# Patient Record
Sex: FEMALE | HISPANIC OR LATINO | Employment: FULL TIME | URBAN - METROPOLITAN AREA
[De-identification: names, ages, dates, MRNs, and addresses within clinical notes are randomized per-mention and may not be internally consistent; named-entity substitution may affect disease eponyms.]

---

## 2018-05-16 ENCOUNTER — PROCEDURE VISIT (OUTPATIENT)
Dept: OBSTETRICS AND GYNECOLOGY | Facility: CLINIC | Age: 38
End: 2018-05-16

## 2018-05-16 ENCOUNTER — INITIAL PRENATAL (OUTPATIENT)
Dept: OBSTETRICS AND GYNECOLOGY | Facility: CLINIC | Age: 38
End: 2018-05-16

## 2018-05-16 VITALS — WEIGHT: 123.4 LBS | DIASTOLIC BLOOD PRESSURE: 70 MMHG | SYSTOLIC BLOOD PRESSURE: 120 MMHG

## 2018-05-16 DIAGNOSIS — O02.1 MISSED AB: Primary | ICD-10-CM

## 2018-05-16 PROCEDURE — 99203 OFFICE O/P NEW LOW 30 MIN: CPT | Performed by: OBSTETRICS & GYNECOLOGY

## 2018-05-16 PROCEDURE — 76817 TRANSVAGINAL US OBSTETRIC: CPT | Performed by: OBSTETRICS & GYNECOLOGY

## 2018-05-16 NOTE — PROGRESS NOTES
Cc: Initial OB visit    History of present illness: 37-year-old  6 para 1 presents for an initial obstetric visit today.  Ultrasound was performed as part of this visit which revealed a 7 week intrauterine pregnancy with no cardiac activity.  The patient denies any abdominal pain.  She denies any vaginal bleeding.  She denies any abdominal trauma.  She does report that her first pregnancy was an ectopic, which required a salpingectomy in [place].  Her second third and fourth pregnancies resulted in miscarriages, which were allowed to proceed naturally area after this, she had a pregnancy resulting in the delivery of a healthy baby at term.  This is the next pregnancy.    Review of systems    This is negative for vaginal bleeding.  It is negative for abdominal pain.  It is negative for fever or chills.  All other systems are reviewed and are negative    Physical examination    The lungs are clear to auscultation bilaterally  Heart has a regular rate and rhythm  The abdomen is soft and nondistended.  There are no palpable masses.  The abdomen is nontender to palpation  Pelvic examination reveals normal female external genitalia.  There is no blood in the vaginal vault.  Cervical os is closed and nontender.  The uterus is 6 weeks' size  There are no adnexal masses present  Extremities are equal in size with minimal edema    Assessment and plan    Missed AB.  I counseled the patient through a  regarding the meaning of these findings and we also reviewed her ultrasound in detail.  We discussed her previous pregnancy history as well as options for management.  The benefits and risks of expectant management with serial quantitative hCGs versus suction D&C were discussed with the patient.  I showed her diagram and answered her questions through .  She would like to manage this expectantly.  We will check a type and screen today as well as a quantitative hCG.  The patient will return to the office  weekly for repeat quantitative hCGs.

## 2018-05-17 LAB
ABO GROUP BLD: NORMAL
BLD GP AB SCN SERPL QL: NEGATIVE
HCG INTACT+B SERPL-ACNC: NORMAL MIU/ML
RH BLD: POSITIVE

## 2018-05-23 ENCOUNTER — RESULTS ENCOUNTER (OUTPATIENT)
Dept: OBSTETRICS AND GYNECOLOGY | Facility: CLINIC | Age: 38
End: 2018-05-23

## 2018-05-23 DIAGNOSIS — O02.1 MISSED AB: ICD-10-CM

## 2018-05-24 LAB — HCG INTACT+B SERPL-ACNC: NORMAL MIU/ML

## 2018-05-29 ENCOUNTER — TELEPHONE (OUTPATIENT)
Dept: OBSTETRICS AND GYNECOLOGY | Facility: CLINIC | Age: 38
End: 2018-05-29

## 2018-05-29 DIAGNOSIS — O03.9 MISCARRIAGE: Primary | ICD-10-CM

## 2018-05-30 ENCOUNTER — RESULTS ENCOUNTER (OUTPATIENT)
Dept: OBSTETRICS AND GYNECOLOGY | Facility: CLINIC | Age: 38
End: 2018-05-30

## 2018-05-30 DIAGNOSIS — O03.9 MISCARRIAGE: ICD-10-CM

## 2018-06-15 ENCOUNTER — OFFICE VISIT (OUTPATIENT)
Dept: OBSTETRICS AND GYNECOLOGY | Facility: CLINIC | Age: 38
End: 2018-06-15

## 2018-06-15 VITALS — SYSTOLIC BLOOD PRESSURE: 120 MMHG | WEIGHT: 128.9 LBS | DIASTOLIC BLOOD PRESSURE: 70 MMHG

## 2018-06-15 DIAGNOSIS — Z30.09 ENCOUNTER FOR COUNSELING REGARDING CONTRACEPTION: ICD-10-CM

## 2018-06-15 DIAGNOSIS — O03.9 MISCARRIAGE: Primary | ICD-10-CM

## 2018-06-15 LAB — HCG INTACT+B SERPL-ACNC: 5.61 MIU/ML

## 2018-06-15 PROCEDURE — 99213 OFFICE O/P EST LOW 20 MIN: CPT | Performed by: OBSTETRICS & GYNECOLOGY

## 2018-06-15 NOTE — PROGRESS NOTES
RUDDY Smith  is a 37 y.o. female who presents for follow-up of a miscarriage.  We have been following quantitative hCGs.  During the first part of June, the patient experienced a miscarriage.  She had heavy bleeding with the passage of clots and tissue as well as a possible formed fetus between June 1 and third.  Since that time, her bleeding has completely stopped and her pain has resolved.  She presents today to confirm complete miscarriage.  All medication took place with the assistance of a telephone .    Chief Complaint   Patient presents with   • Miscarriage       History reviewed. No pertinent past medical history.    History reviewed. No pertinent surgical history.    Social History     Social History   • Marital status: Single     Spouse name: N/A   • Number of children: N/A   • Years of education: N/A     Occupational History   • Not on file.     Social History Main Topics   • Smoking status: Never Smoker   • Smokeless tobacco: Never Used   • Alcohol use No   • Drug use: No   • Sexual activity: Yes     Partners: Male     Other Topics Concern   • Not on file     Social History Narrative   • No narrative on file       The following portions of the patient's history were reviewed and updated as appropriate: allergies, current medications, past family history, past medical history, past social history, past surgical history and problem list.    Review of Systems is negative for abdominal pain or vaginal bleeding.  It is negative for fever or chills.  It is negative for nausea or vomiting.          Physical Exam   Constitutional: She is oriented to person, place, and time. She appears well-developed and well-nourished.   Abdominal: Soft. She exhibits no distension and no mass. There is no tenderness.   Genitourinary:   Genitourinary Comments: Normal female external genitalia  The vagina is without lesion.  There is no blood in the vaginal vault  Cervix is closed and nontender  The uterus is  normal in size.  It is mobile within the pelvis  No adnexal masses or tenderness are present   Neurological: She is alert and oriented to person, place, and time.   Skin: Skin is warm and dry.   Nursing note and vitals reviewed.      Assessment    Aleja was seen today for miscarriage.    Diagnoses and all orders for this visit:    Miscarriage    Encounter for counseling regarding contraception        Plan  1. Completed AB by history and physical findings.  Counseled.  We will check a quantitative hCG and follow this until it reaches 0.  The patient has undergone blood draw today already.  2. Counseled regarding contraception versus conception after the completion of this miscarriage.  The patient declines any hormonal contraception.  She plans to use condoms.  We discussed the benefits risks and alternatives to this and I answered her questions through a telephone .    History   Smoking Status   • Never Smoker   3.     4.

## 2019-08-06 PROCEDURE — 99284 EMERGENCY DEPT VISIT MOD MDM: CPT

## 2019-08-07 ENCOUNTER — APPOINTMENT (OUTPATIENT)
Dept: ULTRASOUND IMAGING | Facility: HOSPITAL | Age: 39
End: 2019-08-07

## 2019-08-07 ENCOUNTER — ANESTHESIA (OUTPATIENT)
Dept: PERIOP | Facility: HOSPITAL | Age: 39
End: 2019-08-07

## 2019-08-07 ENCOUNTER — HOSPITAL ENCOUNTER (INPATIENT)
Facility: HOSPITAL | Age: 39
LOS: 1 days | Discharge: HOME | End: 2019-08-07
Attending: EMERGENCY MEDICINE | Admitting: OBSTETRICS & GYNECOLOGY

## 2019-08-07 ENCOUNTER — ANESTHESIA EVENT (OUTPATIENT)
Dept: PERIOP | Facility: HOSPITAL | Age: 39
End: 2019-08-07

## 2019-08-07 VITALS
DIASTOLIC BLOOD PRESSURE: 62 MMHG | BODY MASS INDEX: 25.32 KG/M2 | RESPIRATION RATE: 20 BRPM | TEMPERATURE: 97.5 F | SYSTOLIC BLOOD PRESSURE: 106 MMHG | HEART RATE: 85 BPM | OXYGEN SATURATION: 98 % | HEIGHT: 60 IN | WEIGHT: 129 LBS

## 2019-08-07 DIAGNOSIS — O00.90 ECTOPIC PREGNANCY: ICD-10-CM

## 2019-08-07 DIAGNOSIS — O00.101 RIGHT TUBAL PREGNANCY WITHOUT INTRAUTERINE PREGNANCY: Primary | ICD-10-CM

## 2019-08-07 LAB
ABO GROUP BLD: NORMAL
ALBUMIN SERPL-MCNC: 4.3 G/DL (ref 3.5–5.2)
ALBUMIN/GLOB SERPL: 1.3 G/DL
ALP SERPL-CCNC: 52 U/L (ref 39–117)
ALT SERPL W P-5'-P-CCNC: 18 U/L (ref 1–33)
ANION GAP SERPL CALCULATED.3IONS-SCNC: 10.5 MMOL/L (ref 5–15)
AST SERPL-CCNC: 22 U/L (ref 1–32)
BACTERIA UR QL AUTO: NORMAL /HPF
BASOPHILS # BLD AUTO: 0.07 10*3/MM3 (ref 0–0.2)
BASOPHILS NFR BLD AUTO: 0.8 % (ref 0–1.5)
BILIRUB SERPL-MCNC: 0.8 MG/DL (ref 0.2–1.2)
BILIRUB UR QL STRIP: NEGATIVE
BUN BLD-MCNC: 11 MG/DL (ref 6–20)
BUN/CREAT SERPL: 13.1 (ref 7–25)
CALCIUM SPEC-SCNC: 9.9 MG/DL (ref 8.6–10.5)
CHLORIDE SERPL-SCNC: 103 MMOL/L (ref 98–107)
CLARITY UR: ABNORMAL
CO2 SERPL-SCNC: 24.5 MMOL/L (ref 22–29)
COLOR UR: YELLOW
CREAT BLD-MCNC: 0.84 MG/DL (ref 0.57–1)
DEPRECATED RDW RBC AUTO: 42.6 FL (ref 37–54)
EOSINOPHIL # BLD AUTO: 0.17 10*3/MM3 (ref 0–0.4)
EOSINOPHIL NFR BLD AUTO: 1.9 % (ref 0.3–6.2)
ERYTHROCYTE [DISTWIDTH] IN BLOOD BY AUTOMATED COUNT: 11.9 % (ref 12.3–15.4)
GFR SERPL CREATININE-BSD FRML MDRD: 76 ML/MIN/1.73
GFR SERPL CREATININE-BSD FRML MDRD: 92 ML/MIN/1.73
GLOBULIN UR ELPH-MCNC: 3.2 GM/DL
GLUCOSE BLD-MCNC: 107 MG/DL (ref 65–99)
GLUCOSE UR STRIP-MCNC: NEGATIVE MG/DL
HCG INTACT+B SERPL-ACNC: 2035 MIU/ML
HCT VFR BLD AUTO: 38.5 % (ref 34–46.6)
HGB BLD-MCNC: 12.5 G/DL (ref 12–15.9)
HGB UR QL STRIP.AUTO: ABNORMAL
HYALINE CASTS UR QL AUTO: NORMAL /LPF
IMM GRANULOCYTES # BLD AUTO: 0.04 10*3/MM3 (ref 0–0.05)
IMM GRANULOCYTES NFR BLD AUTO: 0.5 % (ref 0–0.5)
KETONES UR QL STRIP: NEGATIVE
LEUKOCYTE ESTERASE UR QL STRIP.AUTO: NEGATIVE
LYMPHOCYTES # BLD AUTO: 1.59 10*3/MM3 (ref 0.7–3.1)
LYMPHOCYTES NFR BLD AUTO: 18.1 % (ref 19.6–45.3)
MCH RBC QN AUTO: 32.1 PG (ref 26.6–33)
MCHC RBC AUTO-ENTMCNC: 32.5 G/DL (ref 31.5–35.7)
MCV RBC AUTO: 98.7 FL (ref 79–97)
MONOCYTES # BLD AUTO: 0.63 10*3/MM3 (ref 0.1–0.9)
MONOCYTES NFR BLD AUTO: 7.2 % (ref 5–12)
NEUTROPHILS # BLD AUTO: 6.28 10*3/MM3 (ref 1.7–7)
NEUTROPHILS NFR BLD AUTO: 71.5 % (ref 42.7–76)
NITRITE UR QL STRIP: NEGATIVE
NRBC BLD AUTO-RTO: 0.1 /100 WBC (ref 0–0.2)
PH UR STRIP.AUTO: 7.5 [PH] (ref 5–8)
PLATELET # BLD AUTO: 349 10*3/MM3 (ref 140–450)
PMV BLD AUTO: 9.2 FL (ref 6–12)
POTASSIUM BLD-SCNC: 3.8 MMOL/L (ref 3.5–5.2)
PROT SERPL-MCNC: 7.5 G/DL (ref 6–8.5)
PROT UR QL STRIP: NEGATIVE
RBC # BLD AUTO: 3.9 10*6/MM3 (ref 3.77–5.28)
RBC # UR: NORMAL /HPF
REF LAB TEST METHOD: NORMAL
RH BLD: POSITIVE
SODIUM BLD-SCNC: 138 MMOL/L (ref 136–145)
SP GR UR STRIP: 1.01 (ref 1–1.03)
SQUAMOUS #/AREA URNS HPF: NORMAL /HPF
UROBILINOGEN UR QL STRIP: ABNORMAL
WBC NRBC COR # BLD: 8.78 10*3/MM3 (ref 3.4–10.8)
WBC UR QL AUTO: NORMAL /HPF

## 2019-08-07 PROCEDURE — 0UT50ZZ RESECTION OF RIGHT FALLOPIAN TUBE, OPEN APPROACH: ICD-10-PCS | Performed by: OBSTETRICS & GYNECOLOGY

## 2019-08-07 PROCEDURE — 59151 TREAT ECTOPIC PREGNANCY: CPT | Performed by: OBSTETRICS & GYNECOLOGY

## 2019-08-07 PROCEDURE — 25010000002 HYDROMORPHONE PER 4 MG: Performed by: ANESTHESIOLOGY

## 2019-08-07 PROCEDURE — 86901 BLOOD TYPING SEROLOGIC RH(D): CPT | Performed by: EMERGENCY MEDICINE

## 2019-08-07 PROCEDURE — 88305 TISSUE EXAM BY PATHOLOGIST: CPT | Performed by: OBSTETRICS & GYNECOLOGY

## 2019-08-07 PROCEDURE — 10T20ZZ RESECTION OF PRODUCTS OF CONCEPTION, ECTOPIC, OPEN APPROACH: ICD-10-PCS | Performed by: OBSTETRICS & GYNECOLOGY

## 2019-08-07 PROCEDURE — 36415 COLL VENOUS BLD VENIPUNCTURE: CPT

## 2019-08-07 PROCEDURE — 85025 COMPLETE CBC W/AUTO DIFF WBC: CPT | Performed by: EMERGENCY MEDICINE

## 2019-08-07 PROCEDURE — S0260 H&P FOR SURGERY: HCPCS | Performed by: OBSTETRICS & GYNECOLOGY

## 2019-08-07 PROCEDURE — 25010000002 PROPOFOL 10 MG/ML EMULSION: Performed by: ANESTHESIOLOGY

## 2019-08-07 PROCEDURE — 93976 VASCULAR STUDY: CPT

## 2019-08-07 PROCEDURE — 25010000002 NEOSTIGMINE PER 0.5 MG: Performed by: ANESTHESIOLOGY

## 2019-08-07 PROCEDURE — 84702 CHORIONIC GONADOTROPIN TEST: CPT | Performed by: EMERGENCY MEDICINE

## 2019-08-07 PROCEDURE — 76817 TRANSVAGINAL US OBSTETRIC: CPT

## 2019-08-07 PROCEDURE — 86900 BLOOD TYPING SEROLOGIC ABO: CPT | Performed by: EMERGENCY MEDICINE

## 2019-08-07 PROCEDURE — 25010000002 MIDAZOLAM PER 1 MG: Performed by: ANESTHESIOLOGY

## 2019-08-07 PROCEDURE — 25010000002 DEXAMETHASONE PER 1 MG: Performed by: ANESTHESIOLOGY

## 2019-08-07 PROCEDURE — 25010000002 FENTANYL CITRATE (PF) 100 MCG/2ML SOLUTION: Performed by: ANESTHESIOLOGY

## 2019-08-07 PROCEDURE — 25010000002 ONDANSETRON PER 1 MG: Performed by: OBSTETRICS & GYNECOLOGY

## 2019-08-07 PROCEDURE — 80053 COMPREHEN METABOLIC PANEL: CPT | Performed by: EMERGENCY MEDICINE

## 2019-08-07 PROCEDURE — 81001 URINALYSIS AUTO W/SCOPE: CPT | Performed by: EMERGENCY MEDICINE

## 2019-08-07 RX ORDER — ACETAMINOPHEN 325 MG/1
650 TABLET ORAL ONCE AS NEEDED
Status: DISCONTINUED | OUTPATIENT
Start: 2019-08-07 | End: 2019-08-07 | Stop reason: HOSPADM

## 2019-08-07 RX ORDER — LIDOCAINE HYDROCHLORIDE AND EPINEPHRINE 10; 10 MG/ML; UG/ML
INJECTION, SOLUTION INFILTRATION; PERINEURAL AS NEEDED
Status: DISCONTINUED | OUTPATIENT
Start: 2019-08-07 | End: 2019-08-07 | Stop reason: HOSPADM

## 2019-08-07 RX ORDER — ONDANSETRON 2 MG/ML
4 INJECTION INTRAMUSCULAR; INTRAVENOUS ONCE AS NEEDED
Status: DISCONTINUED | OUTPATIENT
Start: 2019-08-07 | End: 2019-08-07 | Stop reason: HOSPADM

## 2019-08-07 RX ORDER — LIDOCAINE HYDROCHLORIDE 20 MG/ML
INJECTION, SOLUTION INFILTRATION; PERINEURAL AS NEEDED
Status: DISCONTINUED | OUTPATIENT
Start: 2019-08-07 | End: 2019-08-07 | Stop reason: SURG

## 2019-08-07 RX ORDER — FAMOTIDINE 10 MG/ML
20 INJECTION, SOLUTION INTRAVENOUS ONCE
Status: COMPLETED | OUTPATIENT
Start: 2019-08-07 | End: 2019-08-07

## 2019-08-07 RX ORDER — FENTANYL CITRATE 50 UG/ML
50 INJECTION, SOLUTION INTRAMUSCULAR; INTRAVENOUS
Status: DISCONTINUED | OUTPATIENT
Start: 2019-08-07 | End: 2019-08-07 | Stop reason: HOSPADM

## 2019-08-07 RX ORDER — ROCURONIUM BROMIDE 10 MG/ML
INJECTION, SOLUTION INTRAVENOUS AS NEEDED
Status: DISCONTINUED | OUTPATIENT
Start: 2019-08-07 | End: 2019-08-07 | Stop reason: SURG

## 2019-08-07 RX ORDER — HYDRALAZINE HYDROCHLORIDE 20 MG/ML
5 INJECTION INTRAMUSCULAR; INTRAVENOUS
Status: DISCONTINUED | OUTPATIENT
Start: 2019-08-07 | End: 2019-08-07 | Stop reason: HOSPADM

## 2019-08-07 RX ORDER — HYDROMORPHONE HCL 110MG/55ML
PATIENT CONTROLLED ANALGESIA SYRINGE INTRAVENOUS AS NEEDED
Status: DISCONTINUED | OUTPATIENT
Start: 2019-08-07 | End: 2019-08-07 | Stop reason: SURG

## 2019-08-07 RX ORDER — PROMETHAZINE HYDROCHLORIDE 25 MG/ML
12.5 INJECTION, SOLUTION INTRAMUSCULAR; INTRAVENOUS ONCE AS NEEDED
Status: DISCONTINUED | OUTPATIENT
Start: 2019-08-07 | End: 2019-08-07 | Stop reason: HOSPADM

## 2019-08-07 RX ORDER — SODIUM CHLORIDE, SODIUM LACTATE, POTASSIUM CHLORIDE, CALCIUM CHLORIDE 600; 310; 30; 20 MG/100ML; MG/100ML; MG/100ML; MG/100ML
9 INJECTION, SOLUTION INTRAVENOUS CONTINUOUS
Status: DISCONTINUED | OUTPATIENT
Start: 2019-08-07 | End: 2019-08-07 | Stop reason: HOSPADM

## 2019-08-07 RX ORDER — ONDANSETRON 2 MG/ML
4 INJECTION INTRAMUSCULAR; INTRAVENOUS EVERY 6 HOURS PRN
Status: DISCONTINUED | OUTPATIENT
Start: 2019-08-07 | End: 2019-08-07 | Stop reason: HOSPADM

## 2019-08-07 RX ORDER — FLUMAZENIL 0.1 MG/ML
0.2 INJECTION INTRAVENOUS AS NEEDED
Status: DISCONTINUED | OUTPATIENT
Start: 2019-08-07 | End: 2019-08-07 | Stop reason: HOSPADM

## 2019-08-07 RX ORDER — SODIUM CHLORIDE 0.9 % (FLUSH) 0.9 %
1-10 SYRINGE (ML) INJECTION AS NEEDED
Status: DISCONTINUED | OUTPATIENT
Start: 2019-08-07 | End: 2019-08-07 | Stop reason: HOSPADM

## 2019-08-07 RX ORDER — HYDROCODONE BITARTRATE AND ACETAMINOPHEN 7.5; 325 MG/1; MG/1
1 TABLET ORAL ONCE AS NEEDED
Status: COMPLETED | OUTPATIENT
Start: 2019-08-07 | End: 2019-08-07

## 2019-08-07 RX ORDER — PROMETHAZINE HYDROCHLORIDE 25 MG/1
25 TABLET ORAL ONCE AS NEEDED
Status: DISCONTINUED | OUTPATIENT
Start: 2019-08-07 | End: 2019-08-07 | Stop reason: HOSPADM

## 2019-08-07 RX ORDER — OXYCODONE AND ACETAMINOPHEN 7.5; 325 MG/1; MG/1
1 TABLET ORAL ONCE AS NEEDED
Status: DISCONTINUED | OUTPATIENT
Start: 2019-08-07 | End: 2019-08-07 | Stop reason: HOSPADM

## 2019-08-07 RX ORDER — LIDOCAINE HYDROCHLORIDE 10 MG/ML
0.5 INJECTION, SOLUTION EPIDURAL; INFILTRATION; INTRACAUDAL; PERINEURAL ONCE AS NEEDED
Status: DISCONTINUED | OUTPATIENT
Start: 2019-08-07 | End: 2019-08-07 | Stop reason: HOSPADM

## 2019-08-07 RX ORDER — GLYCOPYRROLATE 0.2 MG/ML
INJECTION INTRAMUSCULAR; INTRAVENOUS AS NEEDED
Status: DISCONTINUED | OUTPATIENT
Start: 2019-08-07 | End: 2019-08-07 | Stop reason: SURG

## 2019-08-07 RX ORDER — PROMETHAZINE HYDROCHLORIDE 25 MG/ML
6.25 INJECTION, SOLUTION INTRAMUSCULAR; INTRAVENOUS
Status: DISCONTINUED | OUTPATIENT
Start: 2019-08-07 | End: 2019-08-07 | Stop reason: HOSPADM

## 2019-08-07 RX ORDER — DEXAMETHASONE SODIUM PHOSPHATE 10 MG/ML
INJECTION INTRAMUSCULAR; INTRAVENOUS AS NEEDED
Status: DISCONTINUED | OUTPATIENT
Start: 2019-08-07 | End: 2019-08-07 | Stop reason: SURG

## 2019-08-07 RX ORDER — MIDAZOLAM HYDROCHLORIDE 1 MG/ML
1 INJECTION INTRAMUSCULAR; INTRAVENOUS
Status: DISCONTINUED | OUTPATIENT
Start: 2019-08-07 | End: 2019-08-07 | Stop reason: HOSPADM

## 2019-08-07 RX ORDER — PROPOFOL 10 MG/ML
VIAL (ML) INTRAVENOUS AS NEEDED
Status: DISCONTINUED | OUTPATIENT
Start: 2019-08-07 | End: 2019-08-07 | Stop reason: SURG

## 2019-08-07 RX ORDER — ACETAMINOPHEN 500 MG
1000 TABLET ORAL EVERY 6 HOURS PRN
Status: DISCONTINUED | OUTPATIENT
Start: 2019-08-07 | End: 2019-08-07

## 2019-08-07 RX ORDER — PROMETHAZINE HYDROCHLORIDE 25 MG/1
25 SUPPOSITORY RECTAL ONCE AS NEEDED
Status: DISCONTINUED | OUTPATIENT
Start: 2019-08-07 | End: 2019-08-07 | Stop reason: HOSPADM

## 2019-08-07 RX ORDER — MIDAZOLAM HYDROCHLORIDE 1 MG/ML
2 INJECTION INTRAMUSCULAR; INTRAVENOUS
Status: DISCONTINUED | OUTPATIENT
Start: 2019-08-07 | End: 2019-08-07 | Stop reason: HOSPADM

## 2019-08-07 RX ORDER — HYDROCODONE BITARTRATE AND ACETAMINOPHEN 5; 325 MG/1; MG/1
1 TABLET ORAL EVERY 6 HOURS PRN
Qty: 20 TABLET | Refills: 0 | Status: SHIPPED | OUTPATIENT
Start: 2019-08-07

## 2019-08-07 RX ORDER — HYDROMORPHONE HYDROCHLORIDE 1 MG/ML
0.5 INJECTION, SOLUTION INTRAMUSCULAR; INTRAVENOUS; SUBCUTANEOUS
Status: DISCONTINUED | OUTPATIENT
Start: 2019-08-07 | End: 2019-08-07 | Stop reason: HOSPADM

## 2019-08-07 RX ORDER — EPHEDRINE SULFATE 50 MG/ML
5 INJECTION, SOLUTION INTRAVENOUS ONCE AS NEEDED
Status: DISCONTINUED | OUTPATIENT
Start: 2019-08-07 | End: 2019-08-07 | Stop reason: HOSPADM

## 2019-08-07 RX ORDER — LIDOCAINE HYDROCHLORIDE 40 MG/ML
SOLUTION TOPICAL AS NEEDED
Status: DISCONTINUED | OUTPATIENT
Start: 2019-08-07 | End: 2019-08-07 | Stop reason: SURG

## 2019-08-07 RX ORDER — DIPHENHYDRAMINE HCL 25 MG
25 CAPSULE ORAL
Status: DISCONTINUED | OUTPATIENT
Start: 2019-08-07 | End: 2019-08-07 | Stop reason: HOSPADM

## 2019-08-07 RX ORDER — MAGNESIUM HYDROXIDE 1200 MG/15ML
LIQUID ORAL AS NEEDED
Status: DISCONTINUED | OUTPATIENT
Start: 2019-08-07 | End: 2019-08-07 | Stop reason: HOSPADM

## 2019-08-07 RX ORDER — SODIUM CHLORIDE 0.9 % (FLUSH) 0.9 %
10 SYRINGE (ML) INJECTION AS NEEDED
Status: DISCONTINUED | OUTPATIENT
Start: 2019-08-07 | End: 2019-08-07 | Stop reason: HOSPADM

## 2019-08-07 RX ORDER — SODIUM CHLORIDE 9 MG/ML
INJECTION, SOLUTION INTRAVENOUS AS NEEDED
Status: DISCONTINUED | OUTPATIENT
Start: 2019-08-07 | End: 2019-08-07 | Stop reason: HOSPADM

## 2019-08-07 RX ORDER — NALOXONE HCL 0.4 MG/ML
0.2 VIAL (ML) INJECTION AS NEEDED
Status: DISCONTINUED | OUTPATIENT
Start: 2019-08-07 | End: 2019-08-07 | Stop reason: HOSPADM

## 2019-08-07 RX ADMIN — MIDAZOLAM 1 MG: 1 INJECTION INTRAMUSCULAR; INTRAVENOUS at 14:37

## 2019-08-07 RX ADMIN — SODIUM CHLORIDE, POTASSIUM CHLORIDE, SODIUM LACTATE AND CALCIUM CHLORIDE 9 ML/HR: 600; 310; 30; 20 INJECTION, SOLUTION INTRAVENOUS at 14:36

## 2019-08-07 RX ADMIN — GLYCOPYRROLATE 0.4 MG: 0.2 INJECTION INTRAMUSCULAR; INTRAVENOUS at 17:48

## 2019-08-07 RX ADMIN — SODIUM CHLORIDE, POTASSIUM CHLORIDE, SODIUM LACTATE AND CALCIUM CHLORIDE: 600; 310; 30; 20 INJECTION, SOLUTION INTRAVENOUS at 17:02

## 2019-08-07 RX ADMIN — FENTANYL CITRATE 50 MCG: 50 INJECTION INTRAMUSCULAR; INTRAVENOUS at 17:54

## 2019-08-07 RX ADMIN — ACETAMINOPHEN 1000 MG: 500 TABLET, FILM COATED ORAL at 09:00

## 2019-08-07 RX ADMIN — HYDROMORPHONE HYDROCHLORIDE 0.5 MG: 2 INJECTION INTRAMUSCULAR; INTRAVENOUS; SUBCUTANEOUS at 17:49

## 2019-08-07 RX ADMIN — LIDOCAINE HYDROCHLORIDE 60 MG: 20 INJECTION, SOLUTION INFILTRATION; PERINEURAL at 17:06

## 2019-08-07 RX ADMIN — NEOSTIGMINE METHYLSULFATE 3 MG: 1 INJECTION INTRAMUSCULAR; INTRAVENOUS; SUBCUTANEOUS at 17:48

## 2019-08-07 RX ADMIN — HYDROMORPHONE HYDROCHLORIDE 0.5 MG: 2 INJECTION INTRAMUSCULAR; INTRAVENOUS; SUBCUTANEOUS at 17:51

## 2019-08-07 RX ADMIN — ONDANSETRON HYDROCHLORIDE 4 MG: 2 SOLUTION INTRAMUSCULAR; INTRAVENOUS at 17:45

## 2019-08-07 RX ADMIN — DEXAMETHASONE SODIUM PHOSPHATE 8 MG: 10 INJECTION INTRAMUSCULAR; INTRAVENOUS at 17:18

## 2019-08-07 RX ADMIN — PROPOFOL 120 MG: 10 INJECTION, EMULSION INTRAVENOUS at 17:06

## 2019-08-07 RX ADMIN — FAMOTIDINE 20 MG: 10 INJECTION INTRAVENOUS at 14:36

## 2019-08-07 RX ADMIN — FENTANYL CITRATE 100 MCG: 50 INJECTION INTRAMUSCULAR; INTRAVENOUS at 17:06

## 2019-08-07 RX ADMIN — ROCURONIUM BROMIDE 35 MG: 10 INJECTION INTRAVENOUS at 17:06

## 2019-08-07 RX ADMIN — LIDOCAINE HYDROCHLORIDE 1 EACH: 40 SOLUTION TOPICAL at 17:09

## 2019-08-07 RX ADMIN — HYDROCODONE BITARTRATE AND ACETAMINOPHEN 1 TABLET: 7.5; 325 TABLET ORAL at 19:12

## 2019-08-07 RX ADMIN — FENTANYL CITRATE 50 MCG: 50 INJECTION INTRAMUSCULAR; INTRAVENOUS at 14:36

## 2019-08-07 NOTE — ED NOTES
Pt reports she was due to start her period on Saturday. Pt noticed bleeding was significantly less than her normal. Pt states she has RLQ pain with bleeding but denies seeing clots.  Pt has been pregnant x4 and has had x2 miscarriages.    Deneis use of birth control. Not on blood thinners  Pt has Hx of eptopic pregnancy. Denies Hx of abdominal surgeries     Shirley Maria RN  08/07/19 0031

## 2019-08-07 NOTE — PROGRESS NOTES
Continued Stay Note  [facility]     Patient Name: Aleja Smith  MRN: [id number]  Today's Date: 8/7/2019    Admit Date: 8/7/2019    Discharge Plan     Row Name 08/07/19 1436       Plan    Plan Comments Attempted to screen,  pt off the floor for sx. Left resources. Jose Angel OJEDA/CCP        Discharge Codes    No documentation.             Shania Aaron RN

## 2019-08-07 NOTE — ANESTHESIA PROCEDURE NOTES
Airway  Urgency: elective    Airway not difficult    General Information and Staff    Patient location during procedure: OR  Anesthesiologist: Shun Lawler MD    Indications and Patient Condition  Indications for airway management: airway protection    Preoxygenated: yes  MILS maintained throughout  Mask difficulty assessment: 1 - vent by mask    Final Airway Details  Final airway type: endotracheal airway      Successful airway: ETT  Cuffed: yes   Successful intubation technique: direct laryngoscopy  Endotracheal tube insertion site: oral  Blade: Robert  Blade size: 3  ETT size (mm): 7.0  Cormack-Lehane Classification: grade I - full view of glottis  Placement verified by: chest auscultation and capnometry   Measured from: lips  ETT to lips (cm): 21  Number of attempts at approach: 1

## 2019-08-07 NOTE — OP NOTE
Operative Note      Aleja Smith  38 y.o.  1980  female  [id number]      8/7/2019    Surgeon(s) and Role:     * Bud Alvarenga MD - Primary     Pre-op Diagnosis:   Ectopic pregnancy           Post-operative Diagnosis: Right tubal ectopic pregnancy                  Assist= Dr Ahuja  Findings/Complications:  Right tubal ectopic pregnancy.  Nonruptured.  Small amount of hemoperitoneum    Description of procedure:  Procedure(s) and Anesthesia Type:     * RIGHT SALPINGECTOMY FOR ECTOPIC PRGNANCY - General  The patient was taken to the operating room where general anesthesia was induced.  She was then placed in dorsal lithotomy position using the Rob stirrups.  Examination under anesthesia revealed a mobile uterus which was midplane in the pelvis.  No adnexal masses were palpable.  The pelvis, perineum and abdomen were prepped and draped in the usual sterile fashion and a sponge stick was placed in the vagina for manipulation of the cervix.      A 1 cm infra umbilical incision was made.  The Veress needle was introduced through this and pneumoperitoneum was insufflated into the abdomen.  The Veress needle was removed and a 10 mm trocar was placed.  The 10 mm laparoscope was then introduced through this trocar.  5 mm trochars were then placed in the left and right lower quadrant under direct visualization.  A grasper was placed on the right side.  The patient was placed in a moderate degree of Trendelenburg and the pelvis was explored.  The uterus was anatomically normal.  The left tube was surgically absent.  The right ovary was anatomically normal.  The right fallopian tube was then elevated into the operative field.  There was an ectopic pregnancy visible at the midportion of the tube.      The harmonic scalpel was then introduced while the right fallopian tube was elevated.  The harmonic scalpel was used to come across the proximal end of the tube.  It was then used to come across the mesosalpinx.  A 5 mm  scope was then placed in the left lower quadrant.  The Endobag was then introduced through the 10 mm port.  The right fallopian tube containing the ectopic pregnancy was placed in the Endobag and removed through the 10 mm trocar.  This was sent to pathology as a specimen.  The 10 mm trocar was then replaced into the abdomen and the laparoscope was replaced into this location.  The suction  was used to remove any hemoperitoneum that had been present.  There was a small amount.  The operative site was carefully examined and it was completely hemostatic.  The ovary was elevated into the operative field.  It was anatomically normal.      At this point, the upper abdomen was explored.  It was anatomically normal.  No pathology was encountered.  All visible bowel was peristalsing normally and was undamaged.  The scope was then returned to the pelvis were all visible bowel was undamaged and was peristalsing normally.  The operative site was hemostatic.  Both of the 5 mm trochars were then removed under direct visualization and pneumoperitoneum was evacuated.  The 10 mm trocar was then removed.      The fascia at the 10 mm site was approximated with 0 Vicryl suture.  Local anesthetic was injected at all 3 sites and the skin was approximated with 4-0 Vicryl in a subcuticular fashion.  The patient was taken to recovery in stable condition.  Anesthesia= General      Estimated Blood Loss: minimal    Specimens:   Order Name Source Comment Collection Info Order Time   TISSUE PATHOLOGY EXAM Fallopian Tube, Right  Collected By: Bud Alvarenga MD 8/7/2019  5:44 PM       @[email or web address]@      Bud Alvarenga MD  8/7/2019   yes

## 2019-08-07 NOTE — ANESTHESIA POSTPROCEDURE EVALUATION
"Patient: Aleja Smith    Procedure Summary     Date:  08/07/19 Room / Location:  [facility] OR 09 / [facility] MAIN OR    Anesthesia Start:  1702 Anesthesia Stop:  1808    Procedure:  RIGHT SALPINGECTOMY FOR ECTOPIC PRGNANCY (N/A Abdomen) Diagnosis:      Surgeon:  Bud Alvarenga MD Provider:  Shun Lawler MD    Anesthesia Type:  general ASA Status:  2 - Emergent          Anesthesia Type: general  Last vitals  BP   106/62 (08/07/19 1940)   Temp   36.4 °C (97.5 °F) (08/07/19 1910)   Pulse   85 (08/07/19 1940)   Resp   20 (08/07/19 1940)     SpO2   98 % (08/07/19 1940)     Post Anesthesia Care and Evaluation    Patient location during evaluation: bedside  Patient participation: complete - patient participated  Level of consciousness: awake and alert  Pain management: adequate  Airway patency: patent  Anesthetic complications: No anesthetic complications    Cardiovascular status: acceptable  Respiratory status: acceptable  Hydration status: acceptable    Comments: /62   Pulse 85   Temp 36.4 °C (97.5 °F) (Oral)   Resp 20   Ht 152.4 cm (60\")   Wt 58.5 kg (129 lb)   LMP 07/13/2019 (Approximate)   SpO2 98%   BMI 25.19 kg/m²       "

## 2019-08-07 NOTE — ANESTHESIA PREPROCEDURE EVALUATION
Anesthesia Evaluation     Patient summary reviewed and Nursing notes reviewed   NPO Solid Status: > 8 hours  NPO Liquid Status: > 8 hours           Airway   Mallampati: II  Neck ROM: full  No difficulty expected  Dental - normal exam     Pulmonary    Cardiovascular     Rhythm: regular        Neuro/Psych  GI/Hepatic/Renal/Endo      Musculoskeletal     Abdominal    Substance History      OB/GYN    (+) Pregnant,         Other                        Anesthesia Plan    ASA 2 - emergent     general     intravenous induction   Anesthetic plan, all risks, benefits, and alternatives have been provided, discussed and informed consent has been obtained with: patient.

## 2019-08-07 NOTE — NURSING NOTE
Attempted to obtain  phone or computer from [facility]. All currently in use. Patient reports that she will be having a visitor translate for her.

## 2019-08-07 NOTE — ED PROVIDER NOTES
EMERGENCY DEPARTMENT ENCOUNTER    CHIEF COMPLAINT  Chief Complaint: RLQ pain and vaginal bleeding  History given by: patient  History limited by: Pt speaks [place] and a family member was used to translate  Room Number: 13/13  PMD: Provider, No Known  OBGYN: Dr. Dorsey    HPI:  Pt is a 38 y.o. female who presents complaining of RLQ pain and vaginal bleeding that started 5 days ago. Pt states that the bleeding is minimal. Pt states that she took a pregnancy test and has recently found out that she is about 4 weeks pregnant. Pt notes that she did have a L sided  tubal pregnancy and had a miscarriage 1 year ago.     Duration:  5 days  Onset: gradual  Timing: constant  Location: RLQ and vagina  Radiation: none  Quality: pain and bleeding  Intensity/Severity: mild  Progression: unchanged  Associated Symptoms: none specified  Aggravating Factors: none  Alleviating Factors: none  Previous Episodes: none  Treatment before arrival: Pt states that she took a pregnancy test and has recently found out that she is about 4 weeks pregnant. Pt notes that she did have a L sided  tubal pregnancy and had a miscarriage 1 year ago.     PAST MEDICAL HISTORY  Active Ambulatory Problems     Diagnosis Date Noted   • No Active Ambulatory Problems     Resolved Ambulatory Problems     Diagnosis Date Noted   • No Resolved Ambulatory Problems     No Additional Past Medical History       PAST SURGICAL HISTORY  No past surgical history on file.    FAMILY HISTORY  No family history on file.    SOCIAL HISTORY  Social History     Socioeconomic History   • Marital status: Single     Spouse name: Not on file   • Number of children: Not on file   • Years of education: Not on file   • Highest education level: Not on file   Tobacco Use   • Smoking status: Never Smoker   • Smokeless tobacco: Never Used   Substance and Sexual Activity   • Alcohol use: No   • Drug use: No   • Sexual activity: Yes     Partners: Male       ALLERGIES  Patient has no known  allergies.    REVIEW OF SYSTEMS  Review of Systems   Constitutional: Negative for fever.   HENT: Negative for sore throat.    Eyes: Negative.    Respiratory: Negative for cough and shortness of breath.    Cardiovascular: Negative for chest pain.   Gastrointestinal: Positive for abdominal pain (RLQ). Negative for diarrhea and vomiting.   Genitourinary: Positive for vaginal bleeding. Negative for dysuria.   Musculoskeletal: Negative for neck pain.   Skin: Negative for rash.   Allergic/Immunologic: Negative.    Neurological: Negative for weakness, numbness and headaches.   Hematological: Negative.    Psychiatric/Behavioral: Negative.    All other systems reviewed and are negative.      PHYSICAL EXAM  ED Triage Vitals   Temp Heart Rate Resp BP SpO2   08/06/19 2323 08/06/19 2323 08/06/19 2323 08/07/19 0032 08/06/19 2323   98.1 °F (36.7 °C) 87 16 116/66 100 %      Temp src Heart Rate Source Patient Position BP Location FiO2 (%)   -- 08/07/19 0032 -- -- --    Monitor          Physical Exam   Constitutional: She is oriented to person, place, and time. No distress.   HENT:   Head: Normocephalic and atraumatic.   Eyes: EOM are normal. Pupils are equal, round, and reactive to light.   Neck: Normal range of motion. Neck supple.   Cardiovascular: Normal rate, regular rhythm and normal heart sounds.   Pulmonary/Chest: Effort normal and breath sounds normal. No respiratory distress.   Abdominal: Soft. There is tenderness (mild) in the right lower quadrant. There is no rebound and no guarding.   Musculoskeletal: Normal range of motion. She exhibits no edema.   Neurological: She is alert and oriented to person, place, and time. She has normal sensation and normal strength.   Skin: Skin is warm and dry. No rash noted.   Psychiatric: Mood and affect normal.   Nursing note and vitals reviewed.      LAB RESULTS  Lab Results (last 24 hours)     Procedure Component Value Units Date/Time    CBC & Differential [[id number]] Collected:   08/07/19 0039    Specimen:  Blood Updated:  08/07/19 0052    Narrative:       The following orders were created for panel order CBC & Differential.  Procedure                               Abnormality         Status                     ---------                               -----------         ------                     CBC Auto Differential[[id number]]        Abnormal            Final result                 Please view results for these tests on the individual orders.    Comprehensive Metabolic Panel [[id number]]  (Abnormal) Collected:  08/07/19 0039    Specimen:  Blood Updated:  08/07/19 0115     Glucose 107 mg/dL      BUN 11 mg/dL      Creatinine 0.84 mg/dL      Sodium 138 mmol/L      Potassium 3.8 mmol/L      Chloride 103 mmol/L      CO2 24.5 mmol/L      Calcium 9.9 mg/dL      Total Protein 7.5 g/dL      Albumin 4.30 g/dL      ALT (SGPT) 18 U/L      AST (SGOT) 22 U/L      Alkaline Phosphatase 52 U/L      Total Bilirubin 0.8 mg/dL      eGFR Non African Amer 76 mL/min/1.73      eGFR  African Amer 92 mL/min/1.73      Globulin 3.2 gm/dL      A/G Ratio 1.3 g/dL      BUN/Creatinine Ratio 13.1     Anion Gap 10.5 mmol/L     Narrative:       GFR Normal >60  Chronic Kidney Disease <60  Kidney Failure <15    hCG, Quantitative, Pregnancy [[id number]] Collected:  08/07/19 0039    Specimen:  Blood Updated:  08/07/19 0113     HCG Quantitative 2,035.00 mIU/mL     Narrative:       HCG Ranges by Gestational Age    3 Weeks         5.4 -      72 mIU/mL  4 Weeks        10.2 -     708 mIU/mL  5 Weeks       217   -   8,245 mIU/mL  6 Weeks       152   -  32,177 mIU/mL  7 Weeks     4,059   - 153,767 mIU/mL  8 Weeks    31,366   - 149,094 mIU/mL  9 Weeks    59,109   - 135,901 mIU/mL  10 Weeks   44,186   - 170,409 mIU/mL  12 Weeks   27,107   - 201,615 mIU/mL  14 Weeks   24,302   -  93,646 mIU/mL  15 Weeks   12,540   -  69,747 mIU/mL  16 Weeks    8,904   -  55,332 mIU/mL  17 Weeks    8,240   -  51,793 mIU/mL  18 Weeks    9,649   -  55,271  mIU/mL    CBC Auto Differential [[id number]]  (Abnormal) Collected:  08/07/19 0039    Specimen:  Blood Updated:  08/07/19 0052     WBC 8.78 10*3/mm3      RBC 3.90 10*6/mm3      Hemoglobin 12.5 g/dL      Hematocrit 38.5 %      MCV 98.7 fL      MCH 32.1 pg      MCHC 32.5 g/dL      RDW 11.9 %      RDW-SD 42.6 fl      MPV 9.2 fL      Platelets 349 10*3/mm3      Neutrophil % 71.5 %      Lymphocyte % 18.1 %      Monocyte % 7.2 %      Eosinophil % 1.9 %      Basophil % 0.8 %      Immature Grans % 0.5 %      Neutrophils, Absolute 6.28 10*3/mm3      Lymphocytes, Absolute 1.59 10*3/mm3      Monocytes, Absolute 0.63 10*3/mm3      Eosinophils, Absolute 0.17 10*3/mm3      Basophils, Absolute 0.07 10*3/mm3      Immature Grans, Absolute 0.04 10*3/mm3      nRBC 0.1 /100 WBC     Urinalysis With Microscopic If Indicated (No Culture) - Urine, Clean Catch [[id number]]  (Abnormal) Collected:  08/07/19 0048    Specimen:  Urine, Clean Catch Updated:  08/07/19 0100     Color, UA Yellow     Appearance, UA Cloudy     pH, UA 7.5     Specific Gravity, UA 1.011     Glucose, UA Negative     Ketones, UA Negative     Bilirubin, UA Negative     Blood, UA Trace     Protein, UA Negative     Leuk Esterase, UA Negative     Nitrite, UA Negative     Urobilinogen, UA 0.2 E.U./dL    Urinalysis, Microscopic Only - Urine, Clean Catch [[id number]] Collected:  08/07/19 0048    Specimen:  Urine, Clean Catch Updated:  08/07/19 0100     RBC, UA 0-2 /HPF      WBC, UA 0-2 /HPF      Bacteria, UA None Seen /HPF      Squamous Epithelial Cells, UA 0-2 /HPF      Hyaline Casts, UA None Seen /LPF      Methodology Automated Microscopy          I ordered the above labs and reviewed the results    RADIOLOGY  US Ob Transvaginal   Final Result   No intrauterine pregnancy is identified. However, gestational age by   dates is only 3 weeks and 4 days, and lack of gestational sac may be   related to very early pregnancy. The patient does have a somewhat   complex structure which is  seen adjacent to the right ovary. This lesion   is indeterminate. It may reflect the fallopian tube, as it appears more   elongated on some of the images. However, ectopic pregnancy is not   excluded. Close obstetric and sonographic follow-up is recommended, as   is attention to serial beta hCGs.       This report was finalized on 2019 2:05 AM by Dr. Fariba Loyd M.D.          US Testicular or Ovarian Vascular Limited    (Results Pending)        I ordered the above noted radiological studies. Interpreted by radiologist. Discussed with radiologist (Dr. Loyd). Reviewed by me in PACS.       PROCEDURES  Procedures      PROGRESS AND CONSULTS     0051- Pelvic US ordered for further evaluation.    0224- Call placed to OBGYN.    0228- Discussed pt's case with Dr. Rincon (OBGYN) who will admit the pt for observation.    0253- Rechecked pt who is resting comfortably in NAD. Informed pt of the lab and imaging results. D/w pt the plan to admit the pt for further observation. Pt understands and agrees with plan. All questions answered.              MEDICATIONS GIVEN IN ER  Medications   sodium chloride 0.9 % flush 10 mL (not administered)           MEDICAL DECISION MAKING  Results were reviewed/discussed with the patient and they were also made aware of online access. Pt also made aware that some labs, such as cultures, will not be resulted during ER visit and follow up with PMD is necessary.     MDM  Number of Diagnoses or Management Options     Amount and/or Complexity of Data Reviewed  Clinical lab tests: reviewed and ordered (HC,035.0)  Tests in the radiology section of CPT®: ordered and reviewed (See radiology note for detailed report)  Discussion of test results with the performing providers: yes (Dr. Loyd)           DIAGNOSIS  Final diagnoses:   Right tubal pregnancy without intrauterine pregnancy       DISPOSITION  ADMISSION    Discussed treatment plan and reason for admission with pt/family  and admitting physician.  Pt/family voiced understanding of the plan for admission for further testing/treatment as needed.         Latest Documented Vital Signs:  As of 3:41 AM  BP- 110/66 HR- 78 Temp- 98.1 °F (36.7 °C) O2 sat- 99%    --  Documentation assistance provided by violet Casey for Dr. Joseph.  Information recorded by the scribe was done at my direction and has been verified and validated by me.       Khalida Casey  08/07/19 0314       Clive Joseph MD  08/07/19 0349

## 2019-08-07 NOTE — H&P
H&P Note    Patient Identification:  Name: Aleja Smith  Age: 38 y.o.  Sex: female  :  1980  MRN: [id number]                       Chief Complaint: Abdominal pain    History of Present Illness:   38-year-old  7 para 1 presented to the emergency room with a history of right lower quadrant abdominal pain and vaginal bleeding.  Her history is significant for a left tubal ectopic in the past which was treated with laparoscopic salpingectomy.  She denied nausea or vomiting.  Denied fever or chills.  Quantitative hCG performed as part of her work-up in the emergency room was 2000.  Ultrasound showed no evidence of an intrauterine gestational sac.  There was a complex structure adjacent to the right ovary.      Overnight, the patient reports minimal vaginal bleeding.  She does, however, have an increase in her right lower quadrant abdominal pain.    Problem List:  [unfilled]  Past Medical History:  No past medical history on file.  Past Surgical History:  No past surgical history on file.   Home Meds:  No medications prior to admission.     Current Meds:   [unfilled]  Allergies:  No Known Allergies  Immunizations:    There is no immunization history on file for this patient.  Social History:   Social History     Tobacco Use   • Smoking status: Never Smoker   • Smokeless tobacco: Never Used   Substance Use Topics   • Alcohol use: No      Family History:  No family history on file.     Review of Systems  Pertinent items are noted in HPI.    Objective:  tMax 24 hrs: Temp (24hrs), Av.5 °F (36.9 °C), Min:98.1 °F (36.7 °C), Max:98.8 °F (37.1 °C)    Vitals Ranges:   Temp:  [98.1 °F (36.7 °C)-98.8 °F (37.1 °C)] 98.8 °F (37.1 °C)  Heart Rate:  [70-87] 70  Resp:  [16] 16  BP: (103-116)/(65-68) 112/68  Intake and Output Last 3 Shifts:   No intake/output data recorded.    Exam:     General Appearance:    Alert, cooperative, no distress, appears stated age   Head:    Normocephalic, without obvious abnormality,  atraumatic   Back:     Symmetric, no curvature, ROM normal, no CVA tenderness   Lungs:     Clear to auscultation bilaterally, respirations unlabored   Chest Wall:    No tenderness or deformity    Heart:    Regular rate and rhythm, S1 and S2 normal, no murmur, rub   or gallop       Abdomen:    Soft, nondistended.  There is mild tenderness on palpation of the right lower quadrant           Extremities:   Extremities normal, atraumatic, no cyanosis or edema   Skin:   Skin color, texture, turgor normal, no rashes or lesions   Lymph nodes:   Cervical, supraclavicular, and axillary nodes normal       Data Review:    Lab Results (last 24 hours)     Procedure Component Value Units Date/Time    Comprehensive Metabolic Panel [[id number]]  (Abnormal) Collected:  08/07/19 0039    Specimen:  Blood Updated:  08/07/19 0115     Glucose 107 mg/dL      BUN 11 mg/dL      Creatinine 0.84 mg/dL      Sodium 138 mmol/L      Potassium 3.8 mmol/L      Chloride 103 mmol/L      CO2 24.5 mmol/L      Calcium 9.9 mg/dL      Total Protein 7.5 g/dL      Albumin 4.30 g/dL      ALT (SGPT) 18 U/L      AST (SGOT) 22 U/L      Alkaline Phosphatase 52 U/L      Total Bilirubin 0.8 mg/dL      eGFR Non African Amer 76 mL/min/1.73      eGFR  African Amer 92 mL/min/1.73      Globulin 3.2 gm/dL      A/G Ratio 1.3 g/dL      BUN/Creatinine Ratio 13.1     Anion Gap 10.5 mmol/L     Narrative:       GFR Normal >60  Chronic Kidney Disease <60  Kidney Failure <15    hCG, Quantitative, Pregnancy [[id number]] Collected:  08/07/19 0039    Specimen:  Blood Updated:  08/07/19 0113     HCG Quantitative 2,035.00 mIU/mL     Narrative:       HCG Ranges by Gestational Age    3 Weeks         5.4 -      72 mIU/mL  4 Weeks        10.2 -     708 mIU/mL  5 Weeks       217   -   8,245 mIU/mL  6 Weeks       152   -  32,177 mIU/mL  7 Weeks     4,059   - 153,767 mIU/mL  8 Weeks    31,366   - 149,094 mIU/mL  9 Weeks    59,109   - 135,901 mIU/mL  10 Weeks   44,186   - 170,409 mIU/mL  12  Weeks   27,107   - 201,615 mIU/mL  14 Weeks   24,302   -  93,646 mIU/mL  15 Weeks   12,540   -  69,747 mIU/mL  16 Weeks    8,904   -  55,332 mIU/mL  17 Weeks    8,240   -  51,793 mIU/mL  18 Weeks    9,649   -  55,271 mIU/mL    Urinalysis With Microscopic If Indicated (No Culture) - Urine, Clean Catch [[id number]]  (Abnormal) Collected:  08/07/19 0048    Specimen:  Urine, Clean Catch Updated:  08/07/19 0100     Color, UA Yellow     Appearance, UA Cloudy     pH, UA 7.5     Specific Gravity, UA 1.011     Glucose, UA Negative     Ketones, UA Negative     Bilirubin, UA Negative     Blood, UA Trace     Protein, UA Negative     Leuk Esterase, UA Negative     Nitrite, UA Negative     Urobilinogen, UA 0.2 E.U./dL    Urinalysis, Microscopic Only - Urine, Clean Catch [[id number]] Collected:  08/07/19 0048    Specimen:  Urine, Clean Catch Updated:  08/07/19 0100     RBC, UA 0-2 /HPF      WBC, UA 0-2 /HPF      Bacteria, UA None Seen /HPF      Squamous Epithelial Cells, UA 0-2 /HPF      Hyaline Casts, UA None Seen /LPF      Methodology Automated Microscopy    CBC & Differential [[id number]] Collected:  08/07/19 0039    Specimen:  Blood Updated:  08/07/19 0052    Narrative:       The following orders were created for panel order CBC & Differential.  Procedure                               Abnormality         Status                     ---------                               -----------         ------                     CBC Auto Differential[[id number]]        Abnormal            Final result                 Please view results for these tests on the individual orders.    CBC Auto Differential [[id number]]  (Abnormal) Collected:  08/07/19 0039    Specimen:  Blood Updated:  08/07/19 0052     WBC 8.78 10*3/mm3      RBC 3.90 10*6/mm3      Hemoglobin 12.5 g/dL      Hematocrit 38.5 %      MCV 98.7 fL      MCH 32.1 pg      MCHC 32.5 g/dL      RDW 11.9 %      RDW-SD 42.6 fl      MPV 9.2 fL      Platelets 349 10*3/mm3      Neutrophil % 71.5 %       Lymphocyte % 18.1 %      Monocyte % 7.2 %      Eosinophil % 1.9 %      Basophil % 0.8 %      Immature Grans % 0.5 %      Neutrophils, Absolute 6.28 10*3/mm3      Lymphocytes, Absolute 1.59 10*3/mm3      Monocytes, Absolute 0.63 10*3/mm3      Eosinophils, Absolute 0.17 10*3/mm3      Basophils, Absolute 0.07 10*3/mm3      Immature Grans, Absolute 0.04 10*3/mm3      nRBC 0.1 /100 WBC         Assessment:    Right tubal pregnancy without intrauterine pregnancy      I counseled the patient regarding the pathophysiology of an ectopic pregnancy.  I explained why believe this is an ectopic.  Specifically, there is no evidence of an intrauterine gestational sac with a quantitative hCG of 2000 and a history of ectopic pregnancy in the past.  All conversation took place through a [place] .      We discussed options for further management.  The options of expectant management with serial hCGs versus medical treatment with methotrexate versus surgical treatment with salpingectomy were discussed with the patient.  Her questions were answered.  After discussing her options, she elected to proceed with surgical management.  She understands that this will leave her with bilateral salpingectomies and the inability to become pregnant without advanced reproductive technologies.  She does not wish to become pregnant again and prefers this option.  We discussed the risks specific to surgery including the risk of hemorrhage, infection and damage to internal organs.  I answered the patient's questions and she would like to proceed.      Bud Alvarenga MD  8/7/2019

## 2019-08-08 ENCOUNTER — TELEPHONE (OUTPATIENT)
Dept: OBSTETRICS AND GYNECOLOGY | Facility: CLINIC | Age: 39
End: 2019-08-08

## 2019-08-08 NOTE — TELEPHONE ENCOUNTER
Pt called requesting work note to be off for 2 weeks faxed to [phone] [employer]UL[employer]on. Please advise.       Thank you

## 2019-08-08 NOTE — TELEPHONE ENCOUNTER
The patient had surgery yesterday.  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  It is okay to be off for 2 weeks until seen for her postoperative checkup.  Thank you.

## 2019-08-09 LAB
CYTO UR: NORMAL
LAB AP CASE REPORT: NORMAL
PATH REPORT.FINAL DX SPEC: NORMAL
PATH REPORT.GROSS SPEC: NORMAL

## 2019-08-14 ENCOUNTER — OFFICE VISIT (OUTPATIENT)
Dept: OBSTETRICS AND GYNECOLOGY | Facility: CLINIC | Age: 39
End: 2019-08-14

## 2019-08-14 VITALS
SYSTOLIC BLOOD PRESSURE: 108 MMHG | DIASTOLIC BLOOD PRESSURE: 62 MMHG | WEIGHT: 125 LBS | BODY MASS INDEX: 24.54 KG/M2 | HEIGHT: 60 IN

## 2019-08-14 DIAGNOSIS — Z09 POSTOP CHECK: Primary | ICD-10-CM

## 2019-08-14 PROCEDURE — 99024 POSTOP FOLLOW-UP VISIT: CPT | Performed by: OBSTETRICS & GYNECOLOGY

## 2019-08-14 RX ORDER — IBUPROFEN 800 MG/1
800 TABLET ORAL EVERY 8 HOURS PRN
Qty: 30 TABLET | Refills: 0 | Status: SHIPPED | OUTPATIENT
Start: 2019-08-14

## 2019-08-14 NOTE — PROGRESS NOTES
RUDDY Smith  is a 38 y.o. female who presents for a postoperative checkup.  She underwent a laparoscopic salpingectomy for a tubal ectopic pregnancy.  She reports that over the weekend and early this week, she had some heavy vaginal bleeding.  This is now decreased to almost none.  The bleeding was accompanied by bilateral lower quadrant abdominal pains which radiated to both legs.  The patient denies fever or chills.  Denies nausea or vomiting.  Bowels and bladder are functioning normally.  Her pain and bleeding have decreased considerably over the last 24 hours.    Chief Complaint   Patient presents with   • Post-op     Patient is here for a postop  for miscarriage with cramping and bleeding        Past Medical History:   Diagnosis Date   • Ectopic pregnancy        Past Surgical History:   Procedure Laterality Date   • DIAGNOSTIC LAPAROSCOPY N/A 8/7/2019    Procedure: RIGHT SALPINGECTOMY FOR ECTOPIC PRGNANCY;  Surgeon: Bud Alvarenga MD;  Location: [facility];  Service: Obstetrics/Gynecology   • SALPINGECTOMY         Social History     Socioeconomic History   • Marital status: Single     Spouse name: Not on file   • Number of children: Not on file   • Years of education: Not on file   • Highest education level: Not on file   Tobacco Use   • Smoking status: Never Smoker   • Smokeless tobacco: Never Used   Substance and Sexual Activity   • Alcohol use: No   • Drug use: No   • Sexual activity: Yes     Partners: Male       The following portions of the patient's history were reviewed and updated as appropriate: allergies, current medications, past family history, past medical history, past social history, past surgical history and problem list.    Review of Systems          Physical Exam   Constitutional: She is oriented to person, place, and time. She appears well-developed and well-nourished.   Abdominal: Soft. She exhibits no distension and no mass. There is no tenderness.   Laparoscopic trocar sites  are well approximated.  Erythema and induration are absent   Genitourinary:   Genitourinary Comments: Normal female external genitalia  The vagina is estrogenized.  There is no blood in the vaginal vault  The cervix is normal in appearance.  There is no cervical motion tenderness  The uterus is mobile within the pelvis.  It is only mildly tender  No adnexal masses or tenderness are palpable   Neurological: She is alert and oriented to person, place, and time.   Skin: Skin is warm and dry.   Nursing note and vitals reviewed.      Assessment    There are no diagnoses linked to this encounter.    Plan  1. Appropriate progress, 1 week postop.  Patient's episode of bleeding and pain is most likely related to shedding of the endometrium after interruption of the patient's ectopic pregnancy.  I counseled her regarding the significance of this through a  by phone.  I answered the patient's questions and she agrees with this assessment.  I will prescribe some 800 mg Motrin for crampy abdominal pain.  I will reassess the patient next week to confirm that she continues to improve.  She was instructed to call or present to the emergency room for nausea, vomiting, fever, chills, increased abdominal pain or any other concerns.  The patient agrees with these recommendations.    2. Return in about 1 week (around 8/21/2019).    Social History     Tobacco Use   Smoking Status Never Smoker   3.     4.

## 2019-08-21 ENCOUNTER — OFFICE VISIT (OUTPATIENT)
Dept: OBSTETRICS AND GYNECOLOGY | Facility: CLINIC | Age: 39
End: 2019-08-21

## 2019-08-21 VITALS
HEIGHT: 60 IN | WEIGHT: 127.8 LBS | BODY MASS INDEX: 25.09 KG/M2 | DIASTOLIC BLOOD PRESSURE: 64 MMHG | SYSTOLIC BLOOD PRESSURE: 116 MMHG

## 2019-08-21 DIAGNOSIS — Z09 POSTOP CHECK: Primary | ICD-10-CM

## 2019-08-21 PROCEDURE — 99024 POSTOP FOLLOW-UP VISIT: CPT | Performed by: OBSTETRICS & GYNECOLOGY

## 2019-08-21 NOTE — PROGRESS NOTES
RUDDY Smith  is a 38 y.o. female who presents for a postoperative checkup after salpingectomy for ectopic pregnancy.  She is feeling well.  She has no surgical pain.  No further vaginal bleeding.  Bowels and bladder are functioning normally.    Chief Complaint   Patient presents with   • Follow-up     Patient is here for a 2 week postop after a dncand is feeling better without bleeding.       Past Medical History:   Diagnosis Date   • Ectopic pregnancy        Past Surgical History:   Procedure Laterality Date   • DIAGNOSTIC LAPAROSCOPY N/A 8/7/2019    Procedure: RIGHT SALPINGECTOMY FOR ECTOPIC PRGNANCY;  Surgeon: Bud Alvarenga MD;  Location: [facility];  Service: Obstetrics/Gynecology   • SALPINGECTOMY         Social History     Socioeconomic History   • Marital status: Single     Spouse name: Not on file   • Number of children: Not on file   • Years of education: Not on file   • Highest education level: Not on file   Tobacco Use   • Smoking status: Never Smoker   • Smokeless tobacco: Never Used   Substance and Sexual Activity   • Alcohol use: No   • Drug use: No   • Sexual activity: Yes     Partners: Male       The following portions of the patient's history were reviewed and updated as appropriate: allergies, current medications, past family history, past medical history, past social history, past surgical history and problem list.    Review of Systems          Physical Exam   Constitutional: She is oriented to person, place, and time. She appears well-developed and well-nourished.   Abdominal: Soft. She exhibits no distension and no mass. There is no tenderness.   Laparoscopic trocar sites are well approximated.  Erythema and induration are absent   Genitourinary:   Genitourinary Comments: Normal female external genitalia  The vagina is estrogenized and without lesion.  There is no blood in the vault.  Cervical motion tenderness is absent  The uterus is mobile within the pelvis.  It is nontender  No  adnexal masses or tenderness are palpable   Neurological: She is alert and oriented to person, place, and time.   Skin: Skin is warm and dry.   Nursing note and vitals reviewed.      Assessment    Aleja was seen today for follow-up.    Diagnoses and all orders for this visit:    Postop check        Plan  1. Appropriate progress 2 weeks postop.  The patient does a job which requires significant heavy lifting.  I recommend 2 more weeks off of work and then the patient can resume all normal activities of daily living  2. It has been many years since an annual examination or Pap smear have been performed.  I recommend that the patient schedule this in the near future.  Patient agrees.    Social History     Tobacco Use   Smoking Status Never Smoker   3.     4.

## 2021-04-02 ENCOUNTER — BULK ORDERING (OUTPATIENT)
Dept: CASE MANAGEMENT | Facility: OTHER | Age: 41
End: 2021-04-02

## 2021-04-02 DIAGNOSIS — Z23 IMMUNIZATION DUE: ICD-10-CM

## (undated) DEVICE — SOL NACL 0.9PCT 1000ML

## (undated) DEVICE — ENDOPOUCH RETRIEVER SPECIMEN RETRIEVAL BAGS: Brand: ENDOPOUCH RETRIEVER

## (undated) DEVICE — ANTIBACTERIAL UNDYED BRAIDED (POLYGLACTIN 910), SYNTHETIC ABSORBABLE SUTURE: Brand: COATED VICRYL

## (undated) DEVICE — APPL CHLORAPREP W/TINT 26ML ORNG

## (undated) DEVICE — GLV SURG BIOGEL LTX PF 7 1/2

## (undated) DEVICE — PAD SANI MAXI W/ADHS SNG WRP 11IN

## (undated) DEVICE — SKIN PREP TRAY W/CHG: Brand: MEDLINE INDUSTRIES, INC.

## (undated) DEVICE — DRSNG WND BORDR/ADHS NONADHR/GZ LF 2X2IN STRL

## (undated) DEVICE — ENDOPATH PNEUMONEEDLE INSUFFLATION NEEDLES WITH LUER LOCK CONNECTORS 120MM: Brand: ENDOPATH

## (undated) DEVICE — ENDOPATH XCEL BLADELESS TROCARS WITH STABILITY SLEEVES: Brand: ENDOPATH XCEL

## (undated) DEVICE — SUT VIC 0 TN 27IN DYED JTN0G

## (undated) DEVICE — 3M™ STERI-STRIP™ COMPOUND BENZOIN TINCTURE 40 BAGS/CARTON 4 CARTONS/CASE C1544: Brand: 3M™ STERI-STRIP™

## (undated) DEVICE — GLV SURG TRIUMPH CLASSIC PF LTX 8 STRL

## (undated) DEVICE — LOU GYN LAPAROSCOPY: Brand: MEDLINE INDUSTRIES, INC.

## (undated) DEVICE — 3M™ STERI-STRIP™ REINFORCED ADHESIVE SKIN CLOSURES, R1547, 1/2 IN X 4 IN (12 MM X 100 MM), 6 STRIPS/ENVELOPE: Brand: 3M™ STERI-STRIP™

## (undated) DEVICE — HARMONIC ACE +7 LAPAROSCOPIC SHEARS ADVANCED HEMOSTASIS 5MM DIAMETER 36CM SHAFT LENGTH  FOR USE WITH GRAY HAND PIECE ONLY: Brand: HARMONIC ACE